# Patient Record
Sex: FEMALE
[De-identification: names, ages, dates, MRNs, and addresses within clinical notes are randomized per-mention and may not be internally consistent; named-entity substitution may affect disease eponyms.]

---

## 2020-06-15 PROBLEM — Z00.00 ENCOUNTER FOR PREVENTIVE HEALTH EXAMINATION: Status: ACTIVE | Noted: 2020-06-15

## 2020-07-18 ENCOUNTER — APPOINTMENT (OUTPATIENT)
Dept: OTOLARYNGOLOGY | Facility: CLINIC | Age: 65
End: 2020-07-18
Payer: MEDICARE

## 2020-07-18 VITALS
BODY MASS INDEX: 32.54 KG/M2 | TEMPERATURE: 98.7 F | SYSTOLIC BLOOD PRESSURE: 162 MMHG | HEART RATE: 67 BPM | WEIGHT: 155 LBS | DIASTOLIC BLOOD PRESSURE: 89 MMHG | HEIGHT: 58 IN

## 2020-07-18 DIAGNOSIS — R90.89 OTHER ABNORMAL FINDINGS ON DIAGNOSTIC IMAGING OF CENTRAL NERVOUS SYSTEM: ICD-10-CM

## 2020-07-18 DIAGNOSIS — H93.3X1 DISORDERS OF RIGHT ACOUSTIC NERVE: ICD-10-CM

## 2020-07-18 DIAGNOSIS — H90.41 SENSORINEURAL HEARING LOSS, UNILATERAL, RIGHT EAR, WITH UNRESTRICTED HEARING ON THE CONTRALATERAL SIDE: ICD-10-CM

## 2020-07-18 PROCEDURE — 99205 OFFICE O/P NEW HI 60 MIN: CPT

## 2020-07-18 NOTE — REVIEW OF SYSTEMS
[Hearing Loss] : hearing loss [Problem Snoring] : problem snoring [Heartburn] : heartburn [Snoring With Pauses] : snoring with pauses [Joint Pain] : joint pain [Itching] : itching [Negative] : Endocrine

## 2020-07-20 PROBLEM — R90.89 ABNORMAL BRAIN MRI: Status: ACTIVE | Noted: 2020-07-20

## 2020-07-20 PROBLEM — H90.41 RIGHT ASYMMETRICAL SNHL: Status: ACTIVE | Noted: 2020-07-20

## 2020-07-20 PROBLEM — H93.3X1: Status: ACTIVE | Noted: 2020-07-20

## 2020-07-20 NOTE — DATA REVIEWED
[de-identified] : no audio available [de-identified] : ZP studies reviewed: intracochlear (basal turn) enhancement, questionable space occupying lesion

## 2020-07-20 NOTE — REASON FOR VISIT
[Initial Evaluation] : an initial evaluation for [FreeTextEntry2] : c/o right ear hearing loss, started 1 year ago

## 2020-07-20 NOTE — CONSULT LETTER
[Consult Letter:] : I had the pleasure of evaluating your patient, [unfilled]. [Dear  ___] : Dear  [unfilled], [Please see my note below.] : Please see my note below. [Consult Closing:] : Thank you very much for allowing me to participate in the care of this patient.  If you have any questions, please do not hesitate to contact me. [FreeTextEntry3] : Marjan De Luna MD\par Otology, Neurotology and Skull Base Surgery\par  [Sincerely,] : Sincerely, [FreeTextEntry2] : Shane Dove MD

## 2020-07-20 NOTE — PHYSICAL EXAM
[Hearing Loss Right Only] : normal [Hearing Loss Left Only] : normal [Rinne Test Air Conduction Persists > Bone Conduction Right] : air conduction greater than bone conduction on the right [Rinne Test Air Conduction Persists > Bone Conduction Left] : air conduction greater than bone conduction on the left [Hearing Wilkins Test (Tuning Fork On Forehead)] : no lateralization of tone [Nystagmus] : ~T no ~M nystagmus was seen [Fukuda Step Test] : Fukuda Step Test was Positive [Romberg's Sign] : Romberg's sign was absent [Fistula Sign] : Fistula Sign: Negative [Past-Pointing] : Past-Pointing: Negative [FreeTextEntry1] : unsteady on rafi (R knee surgery) [Midline] : trachea located in midline position [Normal] : no rashes

## 2020-07-20 NOTE — HISTORY OF PRESENT ILLNESS
[de-identified] : 65yo woman with R hearing loss\par over a year\par she is used to the hearing loss an adjusting ok\par non bothersome tinnitus\par no vertigo\par no balance issues\par no otologic history\par went to ENT, had audio and MRI\par here to discuss results\par mostly worried that she will be dizzy

## 2023-03-22 ENCOUNTER — OFFICE (OUTPATIENT)
Dept: URBAN - METROPOLITAN AREA CLINIC 12 | Facility: CLINIC | Age: 68
Setting detail: OPHTHALMOLOGY
End: 2023-03-22
Payer: MEDICARE

## 2023-03-22 DIAGNOSIS — G43.009: ICD-10-CM

## 2023-03-22 DIAGNOSIS — H25.13: ICD-10-CM

## 2023-03-22 DIAGNOSIS — H43.813: ICD-10-CM

## 2023-03-22 DIAGNOSIS — H35.373: ICD-10-CM

## 2023-03-22 DIAGNOSIS — E11.9: ICD-10-CM

## 2023-03-22 PROCEDURE — 92250 FUNDUS PHOTOGRAPHY W/I&R: CPT | Performed by: OPHTHALMOLOGY

## 2023-03-22 PROCEDURE — 99214 OFFICE O/P EST MOD 30 MIN: CPT | Performed by: OPHTHALMOLOGY

## 2023-03-22 ASSESSMENT — REFRACTION_MANIFEST
OD_ADD: +2.50
OD_AXIS: 055
OS_SPHERE: +3.25
OS_VA1: 20/30-
OS_SPHERE: +2.75
OD_CYLINDER: -0.75
OS_VA1: 20/40-1
OD_SPHERE: +3.75
OD_SPHERE: +3.75
OD_VA1: 20/30-
OS_AXIS: 120
OS_ADD: +2.50
OD_AXIS: 085
OS_CYLINDER: -1.00
OS_CYLINDER: -1.50
OS_AXIS: 125
OD_CYLINDER: -0.50
OD_VA1: 20/30-2

## 2023-03-22 ASSESSMENT — KERATOMETRY
OS_AXISANGLE_DEGREES: 044
OS_K2POWER_DIOPTERS: 48.00
OD_AXISANGLE_DEGREES: 005
OD_K1POWER_DIOPTERS: 47.00
OS_K1POWER_DIOPTERS: 46.50
OD_K2POWER_DIOPTERS: 47.25

## 2023-03-22 ASSESSMENT — REFRACTION_CURRENTRX
OS_ADD: +2.50
OS_AXIS: 134
OD_OVR_VA: 20/
OS_SPHERE: +3.25
OS_AXIS: 128
OS_VPRISM_DIRECTION: PROGS
OS_CYLINDER: -0.25
OS_OVR_VA: 20/
OD_AXIS: 91
OD_SPHERE: +3.50
OD_ADD: +2.50
OD_OVR_VA: 20/
OD_CYLINDER: -0.75
OS_ADD: +2.25
OS_CYLINDER: -0.75
OD_VPRISM_DIRECTION: PROGS
OD_SPHERE: +3.75
OD_VPRISM_DIRECTION: PROGS
OS_VPRISM_DIRECTION: PROGS
OD_CYLINDER: -0.75
OD_ADD: +2.25
OS_SPHERE: +3.50
OD_AXIS: 093
OS_OVR_VA: 20/

## 2023-03-22 ASSESSMENT — REFRACTION_AUTOREFRACTION
OD_SPHERE: +3.75
OS_SPHERE: +2.75
OD_CYLINDER: -0.75
OS_AXIS: 127
OS_CYLINDER: -1.50
OD_AXIS: 056

## 2023-03-22 ASSESSMENT — CONFRONTATIONAL VISUAL FIELD TEST (CVF)
OD_FINDINGS: FULL
OS_FINDINGS: FULL

## 2023-03-22 ASSESSMENT — SPHEQUIV_DERIVED
OS_SPHEQUIV: 2.75
OS_SPHEQUIV: 2
OD_SPHEQUIV: 3.375
OD_SPHEQUIV: 3.375
OS_SPHEQUIV: 2
OD_SPHEQUIV: 3.5

## 2023-03-22 ASSESSMENT — AXIALLENGTH_DERIVED
OD_AL: 21.2124
OD_AL: 21.1731
OS_AL: 21.6159
OD_AL: 21.2124
OS_AL: 21.6159
OS_AL: 21.3734

## 2023-03-22 ASSESSMENT — TONOMETRY
OD_IOP_MMHG: 21
OS_IOP_MMHG: 20

## 2023-03-22 ASSESSMENT — VISUAL ACUITY
OD_BCVA: 20/70
OS_BCVA: 20/30+3

## 2023-04-05 ENCOUNTER — OFFICE (OUTPATIENT)
Dept: URBAN - METROPOLITAN AREA CLINIC 12 | Facility: CLINIC | Age: 68
Setting detail: OPHTHALMOLOGY
End: 2023-04-05
Payer: MEDICARE

## 2023-04-05 DIAGNOSIS — H25.13: ICD-10-CM

## 2023-04-05 DIAGNOSIS — H25.12: ICD-10-CM

## 2023-04-05 PROBLEM — H35.373 EPIRETINAL MEMBRANE; BOTH EYES: Status: ACTIVE | Noted: 2023-03-22

## 2023-04-05 PROCEDURE — 92136 OPHTHALMIC BIOMETRY: CPT | Performed by: OPHTHALMOLOGY

## 2023-04-05 PROCEDURE — 99213 OFFICE O/P EST LOW 20 MIN: CPT | Performed by: OPHTHALMOLOGY

## 2023-04-05 ASSESSMENT — REFRACTION_CURRENTRX
OS_OVR_VA: 20/
OD_ADD: +2.50
OS_ADD: +2.50
OS_AXIS: 128
OD_OVR_VA: 20/
OS_ADD: +2.25
OD_CYLINDER: -0.75
OS_AXIS: 134
OD_SPHERE: +3.50
OS_OVR_VA: 20/
OD_VPRISM_DIRECTION: PROGS
OD_AXIS: 093
OS_CYLINDER: -0.75
OD_SPHERE: +3.75
OD_CYLINDER: -0.75
OD_ADD: +2.25
OD_OVR_VA: 20/
OS_VPRISM_DIRECTION: PROGS
OS_VPRISM_DIRECTION: PROGS
OD_VPRISM_DIRECTION: PROGS
OD_AXIS: 91
OS_SPHERE: +3.25
OS_SPHERE: +3.50
OS_CYLINDER: -0.25

## 2023-04-05 ASSESSMENT — REFRACTION_MANIFEST
OD_ADD: +2.50
OS_AXIS: 125
OD_SPHERE: +3.75
OS_SPHERE: +2.75
OS_VA1: 20/30-
OD_CYLINDER: -0.75
OS_SPHERE: +3.25
OS_CYLINDER: -1.50
OS_VA1: 20/40-1
OD_VA1: 20/30-
OD_CYLINDER: -0.50
OD_VA1: 20/30-2
OD_SPHERE: +3.75
OS_AXIS: 120
OD_AXIS: 085
OS_ADD: +2.50
OD_AXIS: 055
OS_CYLINDER: -1.00

## 2023-04-05 ASSESSMENT — REFRACTION_AUTOREFRACTION
OS_AXIS: 128
OD_SPHERE: +3.75
OS_SPHERE: +3.00
OD_AXIS: 058
OD_CYLINDER: -0.50
OS_CYLINDER: -1.50

## 2023-04-05 ASSESSMENT — AXIALLENGTH_DERIVED
OD_AL: 21.2869
OD_AL: 21.2473
OS_AL: 21.5728
OD_AL: 21.2473
OS_AL: 21.4111
OS_AL: 21.6545

## 2023-04-05 ASSESSMENT — KERATOMETRY
OD_K2POWER_DIOPTERS: 47.25
OD_AXISANGLE_DEGREES: 030
OD_K1POWER_DIOPTERS: 46.50
OS_AXISANGLE_DEGREES: 045
OS_K1POWER_DIOPTERS: 46.25
OS_K2POWER_DIOPTERS: 48.00

## 2023-04-05 ASSESSMENT — SPHEQUIV_DERIVED
OD_SPHEQUIV: 3.375
OS_SPHEQUIV: 2
OS_SPHEQUIV: 2.25
OD_SPHEQUIV: 3.5
OD_SPHEQUIV: 3.5
OS_SPHEQUIV: 2.75

## 2023-04-05 ASSESSMENT — VISUAL ACUITY
OS_BCVA: 20/50+1
OD_BCVA: 20/50-1

## 2023-04-05 ASSESSMENT — TONOMETRY
OS_IOP_MMHG: 17
OD_IOP_MMHG: 17

## 2023-04-05 ASSESSMENT — CONFRONTATIONAL VISUAL FIELD TEST (CVF)
OD_FINDINGS: FULL
OS_FINDINGS: FULL

## 2023-04-17 ENCOUNTER — ASC (OUTPATIENT)
Dept: URBAN - METROPOLITAN AREA SURGERY 8 | Facility: SURGERY | Age: 68
Setting detail: OPHTHALMOLOGY
End: 2023-04-17
Payer: MEDICARE

## 2023-04-17 DIAGNOSIS — H25.12: ICD-10-CM

## 2023-04-17 DIAGNOSIS — H52.212: ICD-10-CM

## 2023-04-17 PROCEDURE — A9270 NON-COVERED ITEM OR SERVICE: HCPCS | Performed by: OPHTHALMOLOGY

## 2023-04-17 PROCEDURE — FEMTO FEMTOSECOND LASER: Performed by: OPHTHALMOLOGY

## 2023-04-17 PROCEDURE — 66984 XCAPSL CTRC RMVL W/O ECP: CPT | Performed by: OPHTHALMOLOGY

## 2023-04-18 ENCOUNTER — OFFICE (OUTPATIENT)
Dept: URBAN - METROPOLITAN AREA CLINIC 12 | Facility: CLINIC | Age: 68
Setting detail: OPHTHALMOLOGY
End: 2023-04-18
Payer: MEDICARE

## 2023-04-18 DIAGNOSIS — Z96.1: ICD-10-CM

## 2023-04-18 PROCEDURE — 99024 POSTOP FOLLOW-UP VISIT: CPT | Performed by: OPTOMETRIST

## 2023-04-18 ASSESSMENT — REFRACTION_CURRENTRX
OS_VPRISM_DIRECTION: PROGS
OS_ADD: +2.25
OD_ADD: +2.50
OS_OVR_VA: 20/
OD_AXIS: 093
OD_VPRISM_DIRECTION: PROGS
OD_AXIS: 91
OS_ADD: +2.50
OS_SPHERE: +3.25
OD_VPRISM_DIRECTION: PROGS
OD_OVR_VA: 20/
OS_SPHERE: +3.50
OD_CYLINDER: -0.75
OS_AXIS: 134
OS_VPRISM_DIRECTION: PROGS
OS_AXIS: 128
OD_SPHERE: +3.75
OD_SPHERE: +3.50
OD_ADD: +2.25
OS_CYLINDER: -0.25
OD_OVR_VA: 20/
OD_CYLINDER: -0.75
OS_OVR_VA: 20/
OS_CYLINDER: -0.75

## 2023-04-18 ASSESSMENT — REFRACTION_MANIFEST
OS_SPHERE: +3.25
OS_AXIS: 125
OD_SPHERE: +3.75
OS_VA1: 20/40-1
OD_CYLINDER: -0.75
OD_CYLINDER: -0.50
OS_CYLINDER: -1.00
OD_SPHERE: +3.75
OS_AXIS: 120
OD_VA1: 20/30-
OD_AXIS: 055
OS_VA1: 20/30-
OD_VA1: 20/30-2
OS_ADD: +2.50
OS_SPHERE: +2.75
OD_ADD: +2.50
OS_CYLINDER: -1.50
OD_AXIS: 085

## 2023-04-18 ASSESSMENT — KERATOMETRY
OS_K1POWER_DIOPTERS: 46.00
OD_K2POWER_DIOPTERS: 47.25
OS_K2POWER_DIOPTERS: 47.50
OD_AXISANGLE_DEGREES: 025
OD_K1POWER_DIOPTERS: 47.00
OS_AXISANGLE_DEGREES: 052

## 2023-04-18 ASSESSMENT — REFRACTION_AUTOREFRACTION
OD_SPHERE: +4.00
OS_SPHERE: +1.25
OS_CYLINDER: -1.50
OD_CYLINDER: -0.75
OS_AXIS: 131
OD_AXIS: 060

## 2023-04-18 ASSESSMENT — SPHEQUIV_DERIVED
OS_SPHEQUIV: 2.75
OS_SPHEQUIV: 2
OD_SPHEQUIV: 3.375
OS_SPHEQUIV: 0.5
OD_SPHEQUIV: 3.625
OD_SPHEQUIV: 3.5

## 2023-04-18 ASSESSMENT — AXIALLENGTH_DERIVED
OD_AL: 21.134
OD_AL: 21.2124
OD_AL: 21.1731
OS_AL: 21.5252
OS_AL: 22.2805
OS_AL: 21.7712

## 2023-04-18 ASSESSMENT — TONOMETRY
OS_IOP_MMHG: 19
OD_IOP_MMHG: 21

## 2023-04-18 ASSESSMENT — CONFRONTATIONAL VISUAL FIELD TEST (CVF)
OS_FINDINGS: FULL
OD_FINDINGS: FULL

## 2023-04-18 ASSESSMENT — VISUAL ACUITY
OD_BCVA: 20/40-1
OS_BCVA: 20/50

## 2023-04-25 ENCOUNTER — RX ONLY (RX ONLY)
Age: 68
End: 2023-04-25

## 2023-04-25 ENCOUNTER — OFFICE (OUTPATIENT)
Dept: URBAN - METROPOLITAN AREA CLINIC 12 | Facility: CLINIC | Age: 68
Setting detail: OPHTHALMOLOGY
End: 2023-04-25
Payer: MEDICARE

## 2023-04-25 DIAGNOSIS — H25.11: ICD-10-CM

## 2023-04-25 PROCEDURE — 92136 OPHTHALMIC BIOMETRY: CPT | Performed by: OPHTHALMOLOGY

## 2023-04-25 ASSESSMENT — REFRACTION_MANIFEST
OS_SPHERE: +2.75
OD_ADD: +2.50
OS_AXIS: 125
OD_AXIS: 055
OS_VA1: 20/30-
OS_ADD: +2.50
OS_CYLINDER: -1.00
OD_VA1: 20/30-
OD_SPHERE: +3.75
OD_SPHERE: +3.75
OD_AXIS: 085
OS_VA1: 20/40-1
OD_VA1: 20/30-2
OS_AXIS: 120
OD_CYLINDER: -0.50
OS_SPHERE: +3.25
OS_CYLINDER: -1.50
OD_CYLINDER: -0.75

## 2023-04-25 ASSESSMENT — REFRACTION_CURRENTRX
OD_OVR_VA: 20/
OS_CYLINDER: -0.75
OD_VPRISM_DIRECTION: PROGS
OS_SPHERE: +3.50
OD_ADD: +2.50
OD_VPRISM_DIRECTION: PROGS
OD_ADD: +2.25
OD_SPHERE: +3.75
OD_OVR_VA: 20/
OD_AXIS: 91
OD_SPHERE: +3.50
OS_OVR_VA: 20/
OS_AXIS: 134
OS_AXIS: 128
OD_AXIS: 093
OS_VPRISM_DIRECTION: PROGS
OD_CYLINDER: -0.75
OS_ADD: +2.50
OD_CYLINDER: -0.75
OS_VPRISM_DIRECTION: PROGS
OS_OVR_VA: 20/
OS_SPHERE: +3.25
OS_CYLINDER: -0.25
OS_ADD: +2.25

## 2023-04-25 ASSESSMENT — AXIALLENGTH_DERIVED
OS_AL: 22.3241
OD_AL: 21.0627
OD_AL: 21.1016
OS_AL: 21.5252
OD_AL: 21.1799
OS_AL: 21.7712

## 2023-04-25 ASSESSMENT — KERATOMETRY
OD_K2POWER_DIOPTERS: 47.75
OS_K1POWER_DIOPTERS: 46.50
OS_K2POWER_DIOPTERS: 47.00
OS_AXISANGLE_DEGREES: 055
OD_AXISANGLE_DEGREES: 016
OD_K1POWER_DIOPTERS: 47.25

## 2023-04-25 ASSESSMENT — SPHEQUIV_DERIVED
OD_SPHEQUIV: 3.375
OD_SPHEQUIV: 3.5
OD_SPHEQUIV: 3.125
OS_SPHEQUIV: 2.75
OS_SPHEQUIV: 0.375
OS_SPHEQUIV: 2

## 2023-04-25 ASSESSMENT — TONOMETRY
OS_IOP_MMHG: 17
OD_IOP_MMHG: 17

## 2023-04-25 ASSESSMENT — REFRACTION_AUTOREFRACTION
OS_CYLINDER: -0.75
OS_SPHERE: +0.75
OD_CYLINDER: -0.75
OD_SPHERE: +3.50
OD_AXIS: 064
OS_AXIS: 131

## 2023-04-25 ASSESSMENT — VISUAL ACUITY
OS_BCVA: 20/40-2
OD_BCVA: 20/30-2

## 2023-04-25 ASSESSMENT — CONFRONTATIONAL VISUAL FIELD TEST (CVF)
OD_FINDINGS: FULL
OS_FINDINGS: FULL

## 2023-05-01 ENCOUNTER — ASC (OUTPATIENT)
Dept: URBAN - METROPOLITAN AREA SURGERY 8 | Facility: SURGERY | Age: 68
Setting detail: OPHTHALMOLOGY
End: 2023-05-01
Payer: MEDICARE

## 2023-05-01 DIAGNOSIS — H25.11: ICD-10-CM

## 2023-05-01 DIAGNOSIS — H52.211: ICD-10-CM

## 2023-05-01 PROCEDURE — 66984 XCAPSL CTRC RMVL W/O ECP: CPT | Performed by: OPHTHALMOLOGY

## 2023-05-01 PROCEDURE — FEMTO PRECISION LASER CATARACT SURGERY: Performed by: OPHTHALMOLOGY

## 2023-05-01 PROCEDURE — A9270 NON-COVERED ITEM OR SERVICE: HCPCS | Performed by: OPHTHALMOLOGY

## 2023-05-02 ENCOUNTER — RX ONLY (RX ONLY)
Age: 68
End: 2023-05-02

## 2023-05-02 ENCOUNTER — OFFICE (OUTPATIENT)
Dept: URBAN - METROPOLITAN AREA CLINIC 12 | Facility: CLINIC | Age: 68
Setting detail: OPHTHALMOLOGY
End: 2023-05-02
Payer: MEDICARE

## 2023-05-02 DIAGNOSIS — Z96.1: ICD-10-CM

## 2023-05-02 PROBLEM — H25.11 CATARACT; RIGHT EYE: Status: RESOLVED | Noted: 2023-04-25 | Resolved: 2023-05-02

## 2023-05-02 PROCEDURE — 99024 POSTOP FOLLOW-UP VISIT: CPT | Performed by: OPTOMETRIST

## 2023-05-02 ASSESSMENT — AXIALLENGTH_DERIVED
OS_AL: 21.449
OD_AL: 21.3221
OD_AL: 21.3619
OS_AL: 21.6933
OD_AL: 22.3652

## 2023-05-02 ASSESSMENT — REFRACTION_CURRENTRX
OS_SPHERE: +3.50
OD_AXIS: 093
OD_ADD: +2.50
OD_VPRISM_DIRECTION: PROGS
OD_OVR_VA: 20/
OD_CYLINDER: -0.75
OD_CYLINDER: -0.75
OD_VPRISM_DIRECTION: PROGS
OS_AXIS: 128
OS_ADD: +2.50
OD_OVR_VA: 20/
OD_SPHERE: +3.75
OD_AXIS: 91
OD_ADD: +2.25
OS_VPRISM_DIRECTION: PROGS
OS_VPRISM_DIRECTION: PROGS
OS_AXIS: 134
OS_SPHERE: +3.25
OS_CYLINDER: -0.25
OS_ADD: +2.25
OS_OVR_VA: 20/
OS_OVR_VA: 20/
OD_SPHERE: +3.50
OS_CYLINDER: -0.75

## 2023-05-02 ASSESSMENT — REFRACTION_MANIFEST
OS_AXIS: 120
OD_CYLINDER: -0.50
OD_SPHERE: +3.75
OD_VA1: 20/30-2
OD_AXIS: 055
OD_SPHERE: +3.75
OS_CYLINDER: -1.00
OD_VA1: 20/30-
OD_AXIS: 085
OS_CYLINDER: -1.50
OS_ADD: +2.50
OS_SPHERE: +2.75
OS_AXIS: 125
OS_VA1: 20/30-
OS_SPHERE: +3.25
OD_ADD: +2.50
OD_CYLINDER: -0.75
OS_VA1: 20/40-1

## 2023-05-02 ASSESSMENT — VISUAL ACUITY
OD_BCVA: 20/30+2
OS_BCVA: 20/20-3

## 2023-05-02 ASSESSMENT — REFRACTION_AUTOREFRACTION
OD_CYLINDER: -0.75
OS_AXIS: 139
OD_AXIS: 120
OS_CYLINDER: -0.50
OS_SPHERE: PLANO
OD_SPHERE: +0.75

## 2023-05-02 ASSESSMENT — KERATOMETRY
OD_AXISANGLE_DEGREES: 023
OS_K1POWER_DIOPTERS: 46.50
OS_AXISANGLE_DEGREES: 061
OS_K2POWER_DIOPTERS: 47.50
OD_K1POWER_DIOPTERS: 46.50
OD_K2POWER_DIOPTERS: 46.75

## 2023-05-02 ASSESSMENT — CONFRONTATIONAL VISUAL FIELD TEST (CVF)
OS_FINDINGS: FULL
OD_FINDINGS: FULL

## 2023-05-02 ASSESSMENT — TONOMETRY
OS_IOP_MMHG: 15
OD_IOP_MMHG: 15

## 2023-05-02 ASSESSMENT — SPHEQUIV_DERIVED
OD_SPHEQUIV: 3.375
OD_SPHEQUIV: 0.375
OS_SPHEQUIV: 2.75
OD_SPHEQUIV: 3.5
OS_SPHEQUIV: 2

## 2023-05-09 ENCOUNTER — OFFICE (OUTPATIENT)
Dept: URBAN - METROPOLITAN AREA CLINIC 12 | Facility: CLINIC | Age: 68
Setting detail: OPHTHALMOLOGY
End: 2023-05-09
Payer: MEDICARE

## 2023-05-09 DIAGNOSIS — Z96.1: ICD-10-CM

## 2023-05-09 PROCEDURE — 99024 POSTOP FOLLOW-UP VISIT: CPT | Performed by: OPHTHALMOLOGY

## 2023-05-09 ASSESSMENT — REFRACTION_AUTOREFRACTION
OS_AXIS: 130
OS_SPHERE: +0.50
OD_AXIS: 81
OD_CYLINDER: -0.75
OD_SPHERE: +0.75
OS_CYLINDER: -1.00

## 2023-05-09 ASSESSMENT — REFRACTION_CURRENTRX
OD_AXIS: 093
OD_SPHERE: +3.75
OS_SPHERE: +3.50
OD_SPHERE: +3.50
OD_CYLINDER: -0.75
OD_VPRISM_DIRECTION: PROGS
OD_VPRISM_DIRECTION: PROGS
OD_OVR_VA: 20/
OD_CYLINDER: -0.75
OS_OVR_VA: 20/
OD_ADD: +2.50
OS_ADD: +2.25
OS_CYLINDER: -0.75
OS_OVR_VA: 20/
OS_CYLINDER: -0.25
OD_ADD: +2.25
OS_VPRISM_DIRECTION: PROGS
OS_ADD: +2.50
OS_VPRISM_DIRECTION: PROGS
OD_AXIS: 91
OS_AXIS: 128
OD_OVR_VA: 20/
OS_SPHERE: +3.25
OS_AXIS: 134

## 2023-05-09 ASSESSMENT — REFRACTION_MANIFEST
OD_CYLINDER: -0.50
OS_AXIS: 120
OS_AXIS: 125
OD_AXIS: 085
OD_AXIS: 055
OS_CYLINDER: -1.50
OD_CYLINDER: -0.75
OS_SPHERE: +3.25
OD_ADD: +2.50
OS_VA1: 20/30-
OS_CYLINDER: -1.00
OS_ADD: +2.50
OD_VA1: 20/30-
OD_VA1: 20/30-2
OD_SPHERE: +3.75
OD_SPHERE: +3.75
OS_VA1: 20/40-1
OS_SPHERE: +2.75

## 2023-05-09 ASSESSMENT — AXIALLENGTH_DERIVED
OD_AL: 21.2846
OD_AL: 21.3244
OS_AL: 22.3728
OS_AL: 21.449
OD_AL: 22.3241
OS_AL: 21.6933

## 2023-05-09 ASSESSMENT — SPHEQUIV_DERIVED
OS_SPHEQUIV: 2
OD_SPHEQUIV: 3.375
OD_SPHEQUIV: 0.375
OS_SPHEQUIV: 2.75
OS_SPHEQUIV: 0
OD_SPHEQUIV: 3.5

## 2023-05-09 ASSESSMENT — KERATOMETRY
OS_AXISANGLE_DEGREES: 45
OS_K2POWER_DIOPTERS: 47.50
OD_K1POWER_DIOPTERS: 46.50
OS_K1POWER_DIOPTERS: 46.50
OD_AXISANGLE_DEGREES: 167
OD_K2POWER_DIOPTERS: 47.00

## 2023-05-09 ASSESSMENT — TONOMETRY
OD_IOP_MMHG: 17
OS_IOP_MMHG: 19

## 2023-05-09 ASSESSMENT — VISUAL ACUITY
OS_BCVA: 20/20-1
OD_BCVA: 20/25

## 2023-05-09 ASSESSMENT — CONFRONTATIONAL VISUAL FIELD TEST (CVF)
OD_FINDINGS: FULL
OS_FINDINGS: FULL

## 2023-06-06 ENCOUNTER — OFFICE (OUTPATIENT)
Dept: URBAN - METROPOLITAN AREA CLINIC 12 | Facility: CLINIC | Age: 68
Setting detail: OPHTHALMOLOGY
End: 2023-06-06
Payer: MEDICARE

## 2023-06-06 ENCOUNTER — RX ONLY (RX ONLY)
Age: 68
End: 2023-06-06

## 2023-06-06 DIAGNOSIS — Z96.1: ICD-10-CM

## 2023-06-06 PROCEDURE — 99024 POSTOP FOLLOW-UP VISIT: CPT | Performed by: OPTOMETRIST

## 2023-06-06 ASSESSMENT — REFRACTION_CURRENTRX
OD_SPHERE: +3.75
OD_SPHERE: +2.25
OD_OVR_VA: 20/
OS_AXIS: 128
OS_SPHERE: +2.25
OS_OVR_VA: 20/
OS_VPRISM_DIRECTION: SV
OD_AXIS: 91
OS_ADD: +2.25
OD_ADD: +2.50
OS_OVR_VA: 20/
OD_VPRISM_DIRECTION: SV
OS_AXIS: 134
OS_VPRISM_DIRECTION: PROGS
OS_SPHERE: +3.25
OD_CYLINDER: -0.75
OD_SPHERE: +3.50
OD_ADD: +2.25
OS_OVR_VA: 20/
OS_CYLINDER: -0.75
OS_VPRISM_DIRECTION: PROGS
OD_VPRISM_DIRECTION: PROGS
OD_OVR_VA: 20/
OD_OVR_VA: 20/
OS_ADD: +2.50
OS_SPHERE: +3.50
OS_CYLINDER: -0.25
OD_AXIS: 093
OD_VPRISM_DIRECTION: PROGS
OD_CYLINDER: -0.75

## 2023-06-06 ASSESSMENT — REFRACTION_MANIFEST
OS_SPHERE: +3.25
OS_CYLINDER: -1.00
OD_VA1: 20/30-2
OS_AXIS: 125
OD_AXIS: 055
OD_ADD: +2.50
OD_SPHERE: +3.75
OD_VA1: 20/30-
OS_ADD: +2.50
OD_CYLINDER: -0.50
OS_AXIS: 120
OS_VA1: 20/30-
OS_SPHERE: +2.75
OS_CYLINDER: -1.50
OD_CYLINDER: -0.75
OS_VA1: 20/40-1
OD_SPHERE: +3.75
OD_AXIS: 085

## 2023-06-06 ASSESSMENT — KERATOMETRY
OD_K2POWER_DIOPTERS: 47.25
OD_K1POWER_DIOPTERS: 47.00
OS_AXISANGLE_DEGREES: 49
OS_K1POWER_DIOPTERS: 46.25
OD_AXISANGLE_DEGREES: 111
OS_K2POWER_DIOPTERS: 47.50

## 2023-06-06 ASSESSMENT — AXIALLENGTH_DERIVED
OS_AL: 21.7322
OS_AL: 21.487
OD_AL: 22.2446
OD_AL: 21.1731
OD_AL: 21.2124
OS_AL: 22.3266

## 2023-06-06 ASSESSMENT — TONOMETRY
OD_IOP_MMHG: 17
OS_IOP_MMHG: 16

## 2023-06-06 ASSESSMENT — SPHEQUIV_DERIVED
OD_SPHEQUIV: 0.25
OD_SPHEQUIV: 3.5
OS_SPHEQUIV: 2
OS_SPHEQUIV: 2.75
OD_SPHEQUIV: 3.375
OS_SPHEQUIV: 0.25

## 2023-06-06 ASSESSMENT — REFRACTION_AUTOREFRACTION
OD_AXIS: 54
OS_AXIS: 127
OS_SPHERE: +0.75
OS_CYLINDER: -1.00
OD_CYLINDER: -0.50
OD_SPHERE: +0.50

## 2023-06-06 ASSESSMENT — VISUAL ACUITY
OD_BCVA: 20/25-2
OS_BCVA: 20/20-1

## 2023-06-06 ASSESSMENT — CONFRONTATIONAL VISUAL FIELD TEST (CVF)
OS_FINDINGS: FULL
OD_FINDINGS: FULL

## 2023-06-20 ENCOUNTER — RX ONLY (RX ONLY)
Age: 68
End: 2023-06-20

## 2023-06-20 ENCOUNTER — OFFICE (OUTPATIENT)
Dept: URBAN - METROPOLITAN AREA CLINIC 12 | Facility: CLINIC | Age: 68
Setting detail: OPHTHALMOLOGY
End: 2023-06-20
Payer: MEDICARE

## 2023-06-20 DIAGNOSIS — Z96.1: ICD-10-CM

## 2023-06-20 PROBLEM — H02.824 LID LESION; RIGHT UPPER LID, LEFT UPPER LID: Status: ACTIVE | Noted: 2023-06-06

## 2023-06-20 PROBLEM — H02.821 LID LESION; RIGHT UPPER LID, LEFT UPPER LID: Status: ACTIVE | Noted: 2023-06-06

## 2023-06-20 PROCEDURE — 99024 POSTOP FOLLOW-UP VISIT: CPT | Performed by: OPHTHALMOLOGY

## 2023-06-20 ASSESSMENT — AXIALLENGTH_DERIVED
OD_AL: 21.2102
OD_AL: 22.2421
OS_AL: 22.2446
OS_AL: 21.6545
OD_AL: 21.2496
OS_AL: 21.4111

## 2023-06-20 ASSESSMENT — REFRACTION_CURRENTRX
OD_VPRISM_DIRECTION: PROGS
OS_ADD: +2.50
OS_CYLINDER: -0.25
OS_VPRISM_DIRECTION: SV
OD_CYLINDER: -0.75
OD_VPRISM_DIRECTION: PROGS
OD_SPHERE: +2.25
OS_ADD: +2.25
OD_AXIS: 093
OS_SPHERE: +3.25
OD_ADD: +2.50
OD_ADD: +2.25
OS_VPRISM_DIRECTION: PROGS
OS_AXIS: 134
OS_SPHERE: +2.25
OD_SPHERE: +3.50
OD_OVR_VA: 20/
OS_OVR_VA: 20/
OS_SPHERE: +3.50
OS_VPRISM_DIRECTION: PROGS
OD_OVR_VA: 20/
OS_AXIS: 128
OS_CYLINDER: -0.75
OD_AXIS: 91
OS_OVR_VA: 20/
OD_OVR_VA: 20/
OD_VPRISM_DIRECTION: SV
OD_CYLINDER: -0.75
OD_SPHERE: +3.75
OS_OVR_VA: 20/

## 2023-06-20 ASSESSMENT — SPHEQUIV_DERIVED
OD_SPHEQUIV: 3.375
OS_SPHEQUIV: 2
OD_SPHEQUIV: 0.375
OS_SPHEQUIV: 0.25
OD_SPHEQUIV: 3.5
OS_SPHEQUIV: 2.75

## 2023-06-20 ASSESSMENT — KERATOMETRY
OD_K1POWER_DIOPTERS: 47.00
OD_AXISANGLE_DEGREES: 090
OS_K2POWER_DIOPTERS: 47.50
OS_AXISANGLE_DEGREES: 054
OD_K2POWER_DIOPTERS: 47.00
OS_K1POWER_DIOPTERS: 46.75

## 2023-06-20 ASSESSMENT — CONFRONTATIONAL VISUAL FIELD TEST (CVF)
OD_FINDINGS: FULL
OS_FINDINGS: FULL

## 2023-06-20 ASSESSMENT — REFRACTION_MANIFEST
OD_SPHERE: +3.75
OS_VA1: 20/40-1
OD_ADD: +2.50
OS_AXIS: 120
OD_AXIS: 055
OS_SPHERE: +2.75
OD_CYLINDER: -0.75
OS_CYLINDER: -1.50
OD_VA1: 20/30-2
OD_SPHERE: +3.75
OD_CYLINDER: -0.50
OS_VA1: 20/30-
OD_AXIS: 085
OS_CYLINDER: -1.00
OS_SPHERE: +3.25
OS_ADD: +2.50
OS_AXIS: 125
OD_VA1: 20/30-

## 2023-06-20 ASSESSMENT — REFRACTION_AUTOREFRACTION
OD_CYLINDER: -0.25
OS_AXIS: 129
OD_AXIS: 096
OD_SPHERE: +0.50
OS_CYLINDER: -1.00
OS_SPHERE: +0.75

## 2023-06-20 ASSESSMENT — TONOMETRY
OD_IOP_MMHG: 16
OS_IOP_MMHG: 19

## 2023-06-20 ASSESSMENT — VISUAL ACUITY
OD_BCVA: 20/25+3
OS_BCVA: 20/20-1

## 2023-08-01 ENCOUNTER — OFFICE (OUTPATIENT)
Dept: URBAN - METROPOLITAN AREA CLINIC 12 | Facility: CLINIC | Age: 68
Setting detail: OPHTHALMOLOGY
End: 2023-08-01
Payer: MEDICARE

## 2023-08-01 DIAGNOSIS — H02.824: ICD-10-CM

## 2023-08-01 DIAGNOSIS — G43.009: ICD-10-CM

## 2023-08-01 DIAGNOSIS — H43.813: ICD-10-CM

## 2023-08-01 DIAGNOSIS — H02.821: ICD-10-CM

## 2023-08-01 DIAGNOSIS — H35.373: ICD-10-CM

## 2023-08-01 DIAGNOSIS — E11.9: ICD-10-CM

## 2023-08-01 PROBLEM — H02.403 PTOSIS OF EYELID, UNSPECIFIED; BOTH EYES: Status: ACTIVE | Noted: 2023-08-01

## 2023-08-01 PROCEDURE — 92134 CPTRZ OPH DX IMG PST SGM RTA: CPT | Performed by: OPHTHALMOLOGY

## 2023-08-01 PROCEDURE — 92014 COMPRE OPH EXAM EST PT 1/>: CPT | Performed by: OPHTHALMOLOGY

## 2023-08-01 ASSESSMENT — SPHEQUIV_DERIVED
OS_SPHEQUIV: 2
OD_SPHEQUIV: 0
OD_SPHEQUIV: 3.5
OD_SPHEQUIV: 3.375
OS_SPHEQUIV: 2.75
OS_SPHEQUIV: 0.375

## 2023-08-01 ASSESSMENT — REFRACTION_MANIFEST
OS_SPHERE: +3.25
OS_CYLINDER: -1.00
OS_CYLINDER: -1.50
OD_ADD: +2.50
OD_SPHERE: +3.75
OD_VA1: 20/30-
OS_SPHERE: +2.75
OD_AXIS: 055
OD_CYLINDER: -0.50
OS_VA1: 20/30-
OS_ADD: +2.50
OS_VA1: 20/40-1
OD_VA1: 20/30-2
OD_SPHERE: +3.75
OD_AXIS: 085
OD_CYLINDER: -0.75
OS_AXIS: 120
OS_AXIS: 125

## 2023-08-01 ASSESSMENT — KERATOMETRY
OS_K2POWER_DIOPTERS: 47.50
OD_K1POWER_DIOPTERS: 47.00
OS_K1POWER_DIOPTERS: 46.50
OS_AXISANGLE_DEGREES: 035
OD_K2POWER_DIOPTERS: 47.50
OD_AXISANGLE_DEGREES: 164

## 2023-08-01 ASSESSMENT — VISUAL ACUITY
OS_BCVA: 20/25
OD_BCVA: 20/25

## 2023-08-01 ASSESSMENT — REFRACTION_CURRENTRX
OS_SPHERE: +3.25
OS_CYLINDER: -0.25
OD_AXIS: 91
OS_OVR_VA: 20/
OD_CYLINDER: -0.75
OS_OVR_VA: 20/
OD_SPHERE: +3.75
OS_VPRISM_DIRECTION: SV
OD_AXIS: 093
OD_SPHERE: +2.25
OD_ADD: +2.50
OS_AXIS: 128
OD_OVR_VA: 20/
OD_SPHERE: +3.50
OD_VPRISM_DIRECTION: PROGS
OD_CYLINDER: -0.75
OD_VPRISM_DIRECTION: SV
OS_ADD: +2.50
OS_ADD: +2.25
OS_SPHERE: +2.25
OD_OVR_VA: 20/
OS_CYLINDER: -0.75
OS_VPRISM_DIRECTION: PROGS
OD_VPRISM_DIRECTION: PROGS
OD_OVR_VA: 20/
OS_SPHERE: +3.50
OS_VPRISM_DIRECTION: PROGS
OS_OVR_VA: 20/
OS_AXIS: 134
OD_ADD: +2.25

## 2023-08-01 ASSESSMENT — LID POSITION - PTOSIS
OD_PTOSIS: RUL 2+
OS_PTOSIS: LUL 1+ 2+

## 2023-08-01 ASSESSMENT — AXIALLENGTH_DERIVED
OD_AL: 21.1754
OD_AL: 21.1362
OS_AL: 21.6933
OD_AL: 22.2905
OS_AL: 22.2421
OS_AL: 21.449

## 2023-08-01 ASSESSMENT — REFRACTION_AUTOREFRACTION
OD_CYLINDER: -0.50
OS_AXIS: 125
OD_AXIS: 065
OS_CYLINDER: -1.25
OD_SPHERE: +0.25
OS_SPHERE: +1.00

## 2023-08-01 ASSESSMENT — TONOMETRY
OD_IOP_MMHG: 14
OS_IOP_MMHG: 16

## 2023-08-01 ASSESSMENT — CONFRONTATIONAL VISUAL FIELD TEST (CVF)
OD_FINDINGS: FULL
OS_FINDINGS: FULL

## 2023-11-26 ENCOUNTER — OFFICE (OUTPATIENT)
Dept: URBAN - METROPOLITAN AREA CLINIC 100 | Facility: CLINIC | Age: 68
Setting detail: OPHTHALMOLOGY
End: 2023-11-26

## 2023-11-26 PROBLEM — H01.111 ALLERGIC DERMATITIS ; RIGHT UPPER LID, LEFT UPPER LID: Status: ACTIVE | Noted: 2023-11-26

## 2023-11-26 PROBLEM — H02.831 DERMATOCHALASIS; RIGHT UPPER LID, LEFT UPPER LID: Status: ACTIVE | Noted: 2023-11-26

## 2023-11-26 PROBLEM — H02.834 DERMATOCHALASIS; RIGHT UPPER LID, LEFT UPPER LID: Status: ACTIVE | Noted: 2023-11-26

## 2023-11-26 PROBLEM — H01.114 ALLERGIC DERMATITIS ; RIGHT UPPER LID, LEFT UPPER LID: Status: ACTIVE | Noted: 2023-11-26

## 2023-11-26 PROCEDURE — SCCOS COSMETIC CONSULTATION: Performed by: OPHTHALMOLOGY

## 2023-11-26 ASSESSMENT — CONFRONTATIONAL VISUAL FIELD TEST (CVF)
OD_FINDINGS: FULL
OS_FINDINGS: FULL

## 2023-11-26 ASSESSMENT — REFRACTION_AUTOREFRACTION
OS_SPHERE: +1.00
OD_SPHERE: +0.25
OD_CYLINDER: -0.50
OD_AXIS: 065
OS_AXIS: 125
OS_CYLINDER: -1.25

## 2023-11-26 ASSESSMENT — SPHEQUIV_DERIVED
OD_SPHEQUIV: 0
OS_SPHEQUIV: 0.375

## 2023-11-26 ASSESSMENT — LID POSITION - DERMATOCHALASIS
OS_DERMATOCHALASIS: LUL 1+
OD_DERMATOCHALASIS: RUL 1+

## 2024-08-15 ENCOUNTER — OFFICE (OUTPATIENT)
Dept: URBAN - METROPOLITAN AREA CLINIC 12 | Facility: CLINIC | Age: 69
Setting detail: OPHTHALMOLOGY
End: 2024-08-15
Payer: MEDICARE

## 2024-08-15 DIAGNOSIS — G43.009: ICD-10-CM

## 2024-08-15 DIAGNOSIS — H43.813: ICD-10-CM

## 2024-08-15 DIAGNOSIS — Z96.1: ICD-10-CM

## 2024-08-15 DIAGNOSIS — E11.9: ICD-10-CM

## 2024-08-15 DIAGNOSIS — H35.373: ICD-10-CM

## 2024-08-15 PROCEDURE — 92250 FUNDUS PHOTOGRAPHY W/I&R: CPT | Performed by: OPHTHALMOLOGY

## 2024-08-15 PROCEDURE — 92014 COMPRE OPH EXAM EST PT 1/>: CPT | Performed by: OPHTHALMOLOGY

## 2024-08-15 ASSESSMENT — CONFRONTATIONAL VISUAL FIELD TEST (CVF)
OS_FINDINGS: FULL
OD_FINDINGS: FULL

## 2024-08-15 ASSESSMENT — LID POSITION - DERMATOCHALASIS
OS_DERMATOCHALASIS: LUL 1+
OD_DERMATOCHALASIS: RUL 1+

## 2025-04-09 ENCOUNTER — OFFICE (OUTPATIENT)
Dept: URBAN - METROPOLITAN AREA CLINIC 12 | Facility: CLINIC | Age: 70
Setting detail: OPHTHALMOLOGY
End: 2025-04-09
Payer: MEDICARE

## 2025-04-09 DIAGNOSIS — H11.442: ICD-10-CM

## 2025-04-09 DIAGNOSIS — H11.32: ICD-10-CM

## 2025-04-09 PROCEDURE — 92014 COMPRE OPH EXAM EST PT 1/>: CPT | Performed by: STUDENT IN AN ORGANIZED HEALTH CARE EDUCATION/TRAINING PROGRAM

## 2025-04-09 ASSESSMENT — REFRACTION_AUTOREFRACTION
OD_SPHERE: +0.50
OD_CYLINDER: -0.25
OD_AXIS: 126
OS_SPHERE: +1.00
OS_CYLINDER: -1.25
OS_AXIS: 128

## 2025-04-09 ASSESSMENT — KERATOMETRY
OD_K2POWER_DIOPTERS: 47.25
OS_K1POWER_DIOPTERS: 46.75
METHOD_AUTO_MANUAL: AUTO
OD_AXISANGLE_DEGREES: 090
OD_K1POWER_DIOPTERS: 47.25
OS_K2POWER_DIOPTERS: 47.75
OS_AXISANGLE_DEGREES: 046

## 2025-04-09 ASSESSMENT — LID POSITION - DERMATOCHALASIS
OS_DERMATOCHALASIS: LUL 1+
OD_DERMATOCHALASIS: RUL 1+

## 2025-04-09 ASSESSMENT — CONFRONTATIONAL VISUAL FIELD TEST (CVF)
OS_FINDINGS: FULL
OD_FINDINGS: FULL

## 2025-04-09 ASSESSMENT — VISUAL ACUITY
OD_BCVA: 20/25
OS_BCVA: 20/25+3

## 2025-04-09 ASSESSMENT — TONOMETRY
OD_IOP_MMHG: 16
OS_IOP_MMHG: 16

## 2025-08-07 ENCOUNTER — OFFICE (OUTPATIENT)
Dept: URBAN - METROPOLITAN AREA CLINIC 12 | Facility: CLINIC | Age: 70
Setting detail: OPHTHALMOLOGY
End: 2025-08-07
Payer: MEDICARE

## 2025-08-07 DIAGNOSIS — H35.373: ICD-10-CM

## 2025-08-07 DIAGNOSIS — H16.223: ICD-10-CM

## 2025-08-07 DIAGNOSIS — E11.9: ICD-10-CM

## 2025-08-07 DIAGNOSIS — H43.813: ICD-10-CM

## 2025-08-07 PROCEDURE — 92014 COMPRE OPH EXAM EST PT 1/>: CPT | Performed by: OPHTHALMOLOGY

## 2025-08-07 PROCEDURE — 92134 CPTRZ OPH DX IMG PST SGM RTA: CPT | Performed by: OPHTHALMOLOGY

## 2025-08-07 ASSESSMENT — CONFRONTATIONAL VISUAL FIELD TEST (CVF)
OS_FINDINGS: FULL
OD_FINDINGS: FULL

## 2025-08-07 ASSESSMENT — VISUAL ACUITY
OD_BCVA: 20/25
OS_BCVA: 20/30-2

## 2025-08-07 ASSESSMENT — REFRACTION_AUTOREFRACTION
OS_CYLINDER: -1.00
OD_SPHERE: +0.75
OS_AXIS: 127
OS_SPHERE: +1.25
OD_CYLINDER: -0.25
OD_AXIS: 080

## 2025-08-07 ASSESSMENT — KERATOMETRY
OS_K1POWER_DIOPTERS: 46.50
OS_AXISANGLE_DEGREES: 040
OD_AXISANGLE_DEGREES: 009
OS_K2POWER_DIOPTERS: 47.50
METHOD_AUTO_MANUAL: AUTO
OD_K1POWER_DIOPTERS: 46.50
OD_K2POWER_DIOPTERS: 47.00

## 2025-08-07 ASSESSMENT — REFRACTION_CURRENTRX
OS_VPRISM_DIRECTION: SV
OD_OVR_VA: 20/
OS_OVR_VA: 20/
OD_VPRISM_DIRECTION: SV
OS_SPHERE: +2.00
OD_SPHERE: +2.00

## 2025-08-07 ASSESSMENT — SUPERFICIAL PUNCTATE KERATITIS (SPK)
OD_SPK: T 1+
OS_SPK: T 1+

## 2025-08-07 ASSESSMENT — LID POSITION - DERMATOCHALASIS
OD_DERMATOCHALASIS: RUL 1+
OS_DERMATOCHALASIS: LUL 1+

## 2025-08-07 ASSESSMENT — TONOMETRY: OD_IOP_MMHG: 14
